# Patient Record
Sex: MALE | Race: OTHER | HISPANIC OR LATINO | URBAN - METROPOLITAN AREA
[De-identification: names, ages, dates, MRNs, and addresses within clinical notes are randomized per-mention and may not be internally consistent; named-entity substitution may affect disease eponyms.]

---

## 2023-10-09 ENCOUNTER — OFFICE (OUTPATIENT)
Dept: URBAN - METROPOLITAN AREA CLINIC 105 | Facility: CLINIC | Age: 33
End: 2023-10-09

## 2023-10-09 VITALS
HEIGHT: 70 IN | DIASTOLIC BLOOD PRESSURE: 85 MMHG | WEIGHT: 200 LBS | SYSTOLIC BLOOD PRESSURE: 130 MMHG | HEART RATE: 74 BPM | OXYGEN SATURATION: 96 %

## 2023-10-09 DIAGNOSIS — K64.8 OTHER HEMORRHOIDS: ICD-10-CM

## 2023-10-09 DIAGNOSIS — K62.89 OTHER SPECIFIED DISEASES OF ANUS AND RECTUM: ICD-10-CM

## 2023-10-09 PROCEDURE — 99204 OFFICE O/P NEW MOD 45 MIN: CPT

## 2023-10-09 NOTE — SERVICEHPINOTES
Anatoliy  Marylu Stallworth   is seen for an initial visit today. Patient is here to discuss rectal pain. Onset was 6 months ago, he noticed some bright red blood per rectum for 1 day and then it stopped. Sometimes needs to strain to have a bowel movement. A month ago his symptoms returned, had bright red blood per rectum for 1 week which eventually stopped. Pain to the rectal area, he is unable to sit on bleachers he needs to stand for his kids sports events. Pain is bothersome. He saw provider in [place] and was given a cream which may or may not have helped, once he stopped his symptoms returned. Was treated for hemorrhoids. No rectal exam was completed. Pain is not with defecation but pain is present after defecation. He has never had a colonoscopy. No family history of GI illness or CRC.

## 2023-10-09 NOTE — SERVICENOTES
trial of preparation cream twice a with recticare , if not better, call me and we can try cream for fissure to be compounded at [facility] where you live, [place]. add on miralax daily

## 2024-08-15 ENCOUNTER — OFFICE (OUTPATIENT)
Dept: URBAN - METROPOLITAN AREA CLINIC 67 | Facility: CLINIC | Age: 34
End: 2024-08-15

## 2024-08-15 VITALS
HEART RATE: 66 BPM | OXYGEN SATURATION: 99 % | HEIGHT: 70 IN | DIASTOLIC BLOOD PRESSURE: 90 MMHG | SYSTOLIC BLOOD PRESSURE: 130 MMHG | WEIGHT: 209 LBS

## 2024-08-15 DIAGNOSIS — K62.89 OTHER SPECIFIED DISEASES OF ANUS AND RECTUM: ICD-10-CM

## 2024-08-15 DIAGNOSIS — K59.00 CONSTIPATION, UNSPECIFIED: ICD-10-CM

## 2024-08-15 DIAGNOSIS — K62.5 HEMORRHAGE OF ANUS AND RECTUM: ICD-10-CM

## 2024-08-15 DIAGNOSIS — K60.2 ANAL FISSURE, UNSPECIFIED: ICD-10-CM

## 2024-08-15 PROCEDURE — 99214 OFFICE O/P EST MOD 30 MIN: CPT

## 2024-08-15 RX ORDER — NIFEDIPINE
POWDER (GRAM) MISCELLANEOUS
Qty: 30 | Refills: 0 | Status: ACTIVE
Start: 2024-08-15